# Patient Record
Sex: MALE | Race: WHITE | NOT HISPANIC OR LATINO | URBAN - METROPOLITAN AREA
[De-identification: names, ages, dates, MRNs, and addresses within clinical notes are randomized per-mention and may not be internally consistent; named-entity substitution may affect disease eponyms.]

---

## 2017-04-06 ENCOUNTER — FOLLOW UP ESTABLISHED (OUTPATIENT)
Dept: URBAN - METROPOLITAN AREA CLINIC 10 | Facility: CLINIC | Age: 47
End: 2017-04-06
Payer: COMMERCIAL

## 2017-04-06 PROCEDURE — 92014 COMPRE OPH EXAM EST PT 1/>: CPT | Performed by: OPTOMETRIST

## 2017-04-06 PROCEDURE — 92015 DETERMINE REFRACTIVE STATE: CPT | Performed by: OPTOMETRIST

## 2017-04-06 PROCEDURE — 92083 EXTENDED VISUAL FIELD XM: CPT | Performed by: OPTOMETRIST

## 2017-04-06 RX ORDER — TIMOLOL MALEATE 5 MG/ML
0.5 % SOLUTION/ DROPS OPHTHALMIC
Qty: 3 | Refills: 3 | Status: INACTIVE
Start: 2017-04-06 | End: 2017-11-29

## 2017-04-06 RX ORDER — LATANOPROST 50 UG/ML
0.005 % SOLUTION OPHTHALMIC
Qty: 3 | Refills: 3 | Status: INACTIVE
Start: 2017-04-06 | End: 2017-11-29

## 2017-04-06 RX ORDER — TIMOLOL MALEATE 5 MG/ML
0.5 % SOLUTION/ DROPS OPHTHALMIC
Qty: 1 | Refills: 6 | Status: INACTIVE
Start: 2017-04-06 | End: 2017-04-06

## 2017-04-06 RX ORDER — BRIMONIDINE TARTRATE 2 MG/ML
0.2 % SOLUTION/ DROPS OPHTHALMIC
Qty: 3 | Refills: 3 | Status: INACTIVE
Start: 2017-04-06 | End: 2019-01-02

## 2017-04-06 ASSESSMENT — KERATOMETRY
OD: 44.38
OS: 45.00

## 2017-04-06 ASSESSMENT — VISUAL ACUITY
OS: 20/20
OD: 20/20

## 2017-11-29 ENCOUNTER — FOLLOW UP ESTABLISHED (OUTPATIENT)
Dept: URBAN - METROPOLITAN AREA CLINIC 10 | Facility: CLINIC | Age: 47
End: 2017-11-29
Payer: COMMERCIAL

## 2017-11-29 PROCEDURE — 92012 INTRM OPH EXAM EST PATIENT: CPT | Performed by: OPTOMETRIST

## 2017-11-29 PROCEDURE — 92250 FUNDUS PHOTOGRAPHY W/I&R: CPT | Performed by: OPTOMETRIST

## 2017-11-29 RX ORDER — BRINZOLAMIDE/BRIMONIDINE TARTRATE 10; 2 MG/ML; MG/ML
SUSPENSION/ DROPS OPHTHALMIC
Qty: 3 | Refills: 3 | Status: INACTIVE
Start: 2017-11-29 | End: 2019-01-02

## 2017-11-29 ASSESSMENT — INTRAOCULAR PRESSURE
OS: 18
OD: 17

## 2018-04-25 ENCOUNTER — FOLLOW UP ESTABLISHED (OUTPATIENT)
Dept: URBAN - METROPOLITAN AREA CLINIC 10 | Facility: CLINIC | Age: 48
End: 2018-04-25
Payer: COMMERCIAL

## 2018-04-25 DIAGNOSIS — H40.1331 PIGMENTARY GLAUCOMA, BILATERAL, MILD STAGE: Primary | ICD-10-CM

## 2018-04-25 PROCEDURE — 92083 EXTENDED VISUAL FIELD XM: CPT | Performed by: OPTOMETRIST

## 2018-04-25 PROCEDURE — 92133 CPTRZD OPH DX IMG PST SGM ON: CPT | Performed by: OPTOMETRIST

## 2018-04-25 PROCEDURE — 92012 INTRM OPH EXAM EST PATIENT: CPT | Performed by: OPTOMETRIST

## 2018-04-25 RX ORDER — LATANOPROST 50 UG/ML
0.005 % SOLUTION OPHTHALMIC
Qty: 3 | Refills: 3 | Status: INACTIVE
Start: 2018-04-25 | End: 2019-01-02

## 2018-04-25 ASSESSMENT — INTRAOCULAR PRESSURE
OD: 14
OS: 15

## 2019-01-02 ENCOUNTER — FOLLOW UP ESTABLISHED (OUTPATIENT)
Dept: URBAN - METROPOLITAN AREA CLINIC 10 | Facility: CLINIC | Age: 49
End: 2019-01-02
Payer: COMMERCIAL

## 2019-01-02 DIAGNOSIS — H25.013 CORTICAL AGE-RELATED CATARACT, BILATERAL: ICD-10-CM

## 2019-01-02 PROCEDURE — 92250 FUNDUS PHOTOGRAPHY W/I&R: CPT | Performed by: OPTOMETRIST

## 2019-01-02 PROCEDURE — 92014 COMPRE OPH EXAM EST PT 1/>: CPT | Performed by: OPTOMETRIST

## 2019-01-02 RX ORDER — LATANOPROST 50 UG/ML
0.005 % SOLUTION OPHTHALMIC
Qty: 3 | Refills: 3 | Status: INACTIVE
Start: 2019-01-02 | End: 2020-04-29

## 2019-01-02 RX ORDER — BRIMONIDINE TARTRATE 2 MG/ML
0.2 % SOLUTION/ DROPS OPHTHALMIC
Qty: 3 | Refills: 3 | Status: INACTIVE
Start: 2019-01-02 | End: 2019-10-31

## 2019-01-02 RX ORDER — BRINZOLAMIDE/BRIMONIDINE TARTRATE 10; 2 MG/ML; MG/ML
SUSPENSION/ DROPS OPHTHALMIC
Qty: 3 | Refills: 3 | Status: INACTIVE
Start: 2019-01-02 | End: 2020-04-29

## 2019-01-02 RX ORDER — BRIMONIDINE TARTRATE, TIMOLOL MALEATE 2; 5 MG/ML; MG/ML
SOLUTION/ DROPS OPHTHALMIC
Qty: 1 | Refills: 3 | Status: INACTIVE
Start: 2019-01-02 | End: 2019-10-31

## 2019-01-02 ASSESSMENT — VISUAL ACUITY
OD: 20/20
OS: 20/25

## 2019-01-02 ASSESSMENT — INTRAOCULAR PRESSURE
OD: 17
OS: 19

## 2019-01-02 ASSESSMENT — KERATOMETRY
OS: 45.00
OD: 44.25

## 2019-10-31 ENCOUNTER — FOLLOW UP ESTABLISHED (OUTPATIENT)
Dept: URBAN - METROPOLITAN AREA CLINIC 10 | Facility: CLINIC | Age: 49
End: 2019-10-31
Payer: COMMERCIAL

## 2019-10-31 DIAGNOSIS — H43.813 VITREOUS DEGENERATION, BILATERAL: ICD-10-CM

## 2019-10-31 PROCEDURE — 92083 EXTENDED VISUAL FIELD XM: CPT | Performed by: OPTOMETRIST

## 2019-10-31 PROCEDURE — 92133 CPTRZD OPH DX IMG PST SGM ON: CPT | Performed by: OPTOMETRIST

## 2019-10-31 PROCEDURE — 92014 COMPRE OPH EXAM EST PT 1/>: CPT | Performed by: OPTOMETRIST

## 2019-10-31 ASSESSMENT — VISUAL ACUITY
OS: 20/25
OD: 20/20

## 2019-10-31 ASSESSMENT — INTRAOCULAR PRESSURE
OD: 20
OS: 21
OD: 21
OS: 20

## 2019-10-31 ASSESSMENT — KERATOMETRY
OS: 45.00
OD: 44.38

## 2020-04-29 ENCOUNTER — FOLLOW UP ESTABLISHED (OUTPATIENT)
Dept: URBAN - METROPOLITAN AREA CLINIC 10 | Facility: CLINIC | Age: 50
End: 2020-04-29
Payer: COMMERCIAL

## 2020-04-29 DIAGNOSIS — H25.813 COMBINED FORMS OF AGE-RELATED CATARACT, BILATERAL: ICD-10-CM

## 2020-04-29 PROCEDURE — 92012 INTRM OPH EXAM EST PATIENT: CPT | Performed by: OPTOMETRIST

## 2020-04-29 PROCEDURE — 92250 FUNDUS PHOTOGRAPHY W/I&R: CPT | Performed by: OPTOMETRIST

## 2020-04-29 RX ORDER — LATANOPROST 50 UG/ML
0.005 % SOLUTION OPHTHALMIC
Qty: 3 | Refills: 3 | Status: ACTIVE
Start: 2020-04-29

## 2020-04-29 RX ORDER — BRINZOLAMIDE/BRIMONIDINE TARTRATE 10; 2 MG/ML; MG/ML
SUSPENSION/ DROPS OPHTHALMIC
Qty: 3 | Refills: 3 | Status: ACTIVE
Start: 2020-04-29

## 2020-04-29 ASSESSMENT — INTRAOCULAR PRESSURE
OD: 13
OS: 16

## 2020-12-31 ENCOUNTER — FOLLOW UP ESTABLISHED (OUTPATIENT)
Dept: URBAN - METROPOLITAN AREA CLINIC 10 | Facility: CLINIC | Age: 50
End: 2020-12-31
Payer: COMMERCIAL

## 2020-12-31 PROCEDURE — 92012 INTRM OPH EXAM EST PATIENT: CPT | Performed by: OPTOMETRIST

## 2020-12-31 PROCEDURE — 92083 EXTENDED VISUAL FIELD XM: CPT | Performed by: OPTOMETRIST

## 2020-12-31 PROCEDURE — 92133 CPTRZD OPH DX IMG PST SGM ON: CPT | Performed by: OPTOMETRIST

## 2020-12-31 ASSESSMENT — VISUAL ACUITY
OD: 20/20
OS: 20/25

## 2020-12-31 ASSESSMENT — INTRAOCULAR PRESSURE
OD: 15
OS: 15

## 2021-08-12 ENCOUNTER — OFFICE VISIT (OUTPATIENT)
Dept: URBAN - METROPOLITAN AREA CLINIC 10 | Facility: CLINIC | Age: 51
End: 2021-08-12
Payer: COMMERCIAL

## 2021-08-12 PROCEDURE — 99213 OFFICE O/P EST LOW 20 MIN: CPT | Performed by: OPTOMETRIST

## 2021-08-12 ASSESSMENT — INTRAOCULAR PRESSURE
OS: 14
OD: 13

## 2021-08-12 NOTE — IMPRESSION/PLAN
Impression: Pigmentary glaucoma, bilateral, mild stage --previous care at [facility]
--Tmax ~20s per [facility] notes Plan: IOP is stable OU. Optos reviewed. HVF: overall full OU
NFL OCT: Normal OD, sup thin, early temp thinning OS. Appears stable from prior. Cont. latanoprost qhs OU. Cont. Simbrinza bid OU. RTC 6 months for IOP check c HVF 24-2, NFL OCT and Mrx.

## 2023-08-30 ENCOUNTER — OFFICE VISIT (OUTPATIENT)
Dept: URBAN - METROPOLITAN AREA CLINIC 10 | Facility: CLINIC | Age: 53
End: 2023-08-30
Payer: COMMERCIAL

## 2023-08-30 DIAGNOSIS — H25.813 COMBINED FORMS OF AGE-RELATED CATARACT, BILATERAL: ICD-10-CM

## 2023-08-30 DIAGNOSIS — H40.1331 PIGMENTARY GLAUCOMA, BILATERAL, MILD STAGE: Primary | ICD-10-CM

## 2023-08-30 DIAGNOSIS — H43.312 VITREOUS MEMBRANES AND STRANDS, LEFT EYE: ICD-10-CM

## 2023-08-30 PROCEDURE — 99214 OFFICE O/P EST MOD 30 MIN: CPT | Performed by: OPTOMETRIST

## 2023-08-30 PROCEDURE — 92134 CPTRZ OPH DX IMG PST SGM RTA: CPT | Performed by: OPTOMETRIST

## 2023-08-30 PROCEDURE — 92133 CPTRZD OPH DX IMG PST SGM ON: CPT | Performed by: OPTOMETRIST

## 2023-08-30 RX ORDER — LATANOPROST 50 UG/ML
0.005 % SOLUTION OPHTHALMIC
Qty: 2.5 | Refills: 3 | Status: ACTIVE
Start: 2023-08-30

## 2023-08-30 RX ORDER — BRINZOLAMIDE/BRIMONIDINE TARTRATE 10; 2 MG/ML; MG/ML
SUSPENSION/ DROPS OPHTHALMIC
Qty: 2.5 | Refills: 3 | Status: ACTIVE
Start: 2023-08-30

## 2023-08-30 RX ORDER — BRINZOLAMIDE/BRIMONIDINE TARTRATE 10; 2 MG/ML; MG/ML
SUSPENSION/ DROPS OPHTHALMIC
Qty: 3 | Refills: 3 | Status: INACTIVE
Start: 2023-08-30 | End: 2023-08-30

## 2023-08-30 ASSESSMENT — INTRAOCULAR PRESSURE
OD: 18
OD: 25
OS: 27
OS: 16

## 2024-08-15 ENCOUNTER — OFFICE VISIT (OUTPATIENT)
Dept: URBAN - METROPOLITAN AREA CLINIC 10 | Facility: CLINIC | Age: 54
End: 2024-08-15
Payer: COMMERCIAL

## 2024-08-15 DIAGNOSIS — H33.311 HORSESHOE TEAR OF RETINA WITHOUT DETACHMENT, RIGHT EYE: Primary | ICD-10-CM

## 2024-08-15 DIAGNOSIS — H40.1331 PIGMENTARY GLAUCOMA, BILATERAL, MILD STAGE: ICD-10-CM

## 2024-08-15 DIAGNOSIS — H25.813 COMBINED FORMS OF AGE-RELATED CATARACT, BILATERAL: ICD-10-CM

## 2024-08-15 PROCEDURE — 92134 CPTRZ OPH DX IMG PST SGM RTA: CPT | Performed by: OPTOMETRIST

## 2024-08-15 PROCEDURE — 99214 OFFICE O/P EST MOD 30 MIN: CPT | Performed by: OPTOMETRIST

## 2024-08-15 ASSESSMENT — INTRAOCULAR PRESSURE
OS: 22
OD: 21

## 2024-08-22 ENCOUNTER — OFFICE VISIT (OUTPATIENT)
Dept: URBAN - METROPOLITAN AREA CLINIC 44 | Facility: CLINIC | Age: 54
End: 2024-08-22
Payer: COMMERCIAL

## 2024-08-22 DIAGNOSIS — H25.813 COMBINED FORMS OF AGE-RELATED CATARACT, BILATERAL: ICD-10-CM

## 2024-08-22 DIAGNOSIS — H43.312 VITREOUS MEMBRANES AND STRANDS, LEFT EYE: ICD-10-CM

## 2024-08-22 DIAGNOSIS — H43.811 VITREOUS DEGENERATION, RIGHT EYE: ICD-10-CM

## 2024-08-22 DIAGNOSIS — H33.311 HORSESHOE TEAR OF RETINA WITHOUT DETACHMENT, RIGHT EYE: Primary | ICD-10-CM

## 2024-08-22 PROCEDURE — 99205 OFFICE O/P NEW HI 60 MIN: CPT | Performed by: OPHTHALMOLOGY

## 2024-08-22 PROCEDURE — 67145 PROPH RTA DTCHMNT PC: CPT | Performed by: OPHTHALMOLOGY

## 2024-08-22 ASSESSMENT — INTRAOCULAR PRESSURE
OS: 22
OD: 17

## 2024-09-25 ENCOUNTER — OFFICE VISIT (OUTPATIENT)
Dept: URBAN - METROPOLITAN AREA CLINIC 10 | Facility: CLINIC | Age: 54
End: 2024-09-25
Payer: COMMERCIAL

## 2024-09-25 DIAGNOSIS — H25.813 COMBINED FORMS OF AGE-RELATED CATARACT, BILATERAL: ICD-10-CM

## 2024-09-25 DIAGNOSIS — H43.811 VITREOUS DEGENERATION, RIGHT EYE: ICD-10-CM

## 2024-09-25 DIAGNOSIS — H43.312 VITREOUS MEMBRANES AND STRANDS, LEFT EYE: ICD-10-CM

## 2024-09-25 DIAGNOSIS — H33.311 HORSESHOE TEAR OF RETINA WITHOUT DETACHMENT, RIGHT EYE: Primary | ICD-10-CM

## 2024-09-25 PROCEDURE — 99214 OFFICE O/P EST MOD 30 MIN: CPT | Performed by: OPHTHALMOLOGY

## 2024-09-25 ASSESSMENT — INTRAOCULAR PRESSURE
OS: 28
OD: 22

## 2024-10-28 ENCOUNTER — OFFICE VISIT (OUTPATIENT)
Dept: URBAN - METROPOLITAN AREA CLINIC 10 | Facility: CLINIC | Age: 54
End: 2024-10-28
Payer: COMMERCIAL

## 2024-10-28 DIAGNOSIS — H33.311 HORSESHOE TEAR OF RETINA WITHOUT DETACHMENT, RIGHT EYE: ICD-10-CM

## 2024-10-28 DIAGNOSIS — H43.811 VITREOUS DEGENERATION, RIGHT EYE: ICD-10-CM

## 2024-10-28 DIAGNOSIS — H43.312 VITREOUS MEMBRANES AND STRANDS, LEFT EYE: ICD-10-CM

## 2024-10-28 DIAGNOSIS — H25.813 COMBINED FORMS OF AGE-RELATED CATARACT, BILATERAL: Primary | ICD-10-CM

## 2024-10-28 DIAGNOSIS — H40.1331 PIGMENTARY GLAUCOMA, BILATERAL, MILD STAGE: ICD-10-CM

## 2024-10-28 DIAGNOSIS — H52.13 MYOPIA, BILATERAL: ICD-10-CM

## 2024-10-28 PROCEDURE — 92020 GONIOSCOPY: CPT | Performed by: OPTOMETRIST

## 2024-10-28 PROCEDURE — 92134 CPTRZ OPH DX IMG PST SGM RTA: CPT | Performed by: OPTOMETRIST

## 2024-10-28 PROCEDURE — 99214 OFFICE O/P EST MOD 30 MIN: CPT | Performed by: OPTOMETRIST

## 2024-10-28 RX ORDER — LATANOPROST 50 UG/ML
0.005 % SOLUTION OPHTHALMIC
Qty: 2.5 | Refills: 6 | Status: ACTIVE
Start: 2024-10-28

## 2024-10-28 ASSESSMENT — VISUAL ACUITY
OS: 20/20
OD: 20/20

## 2024-10-28 ASSESSMENT — INTRAOCULAR PRESSURE
OD: 20
OS: 25
OD: 23

## 2025-04-14 ENCOUNTER — OFFICE VISIT (OUTPATIENT)
Dept: URBAN - METROPOLITAN AREA CLINIC 10 | Facility: CLINIC | Age: 55
End: 2025-04-14
Payer: COMMERCIAL

## 2025-04-14 DIAGNOSIS — H40.1331 PIGMENTARY GLAUCOMA, BILATERAL, MILD STAGE: Primary | ICD-10-CM

## 2025-04-14 PROCEDURE — 76514 ECHO EXAM OF EYE THICKNESS: CPT | Performed by: OPTOMETRIST

## 2025-04-14 PROCEDURE — 92133 CPTRZD OPH DX IMG PST SGM ON: CPT | Performed by: OPTOMETRIST

## 2025-04-14 PROCEDURE — 99213 OFFICE O/P EST LOW 20 MIN: CPT | Performed by: OPTOMETRIST

## 2025-04-14 ASSESSMENT — INTRAOCULAR PRESSURE
OD: 19
OS: 19